# Patient Record
Sex: FEMALE | Race: WHITE | Employment: STUDENT | URBAN - METROPOLITAN AREA
[De-identification: names, ages, dates, MRNs, and addresses within clinical notes are randomized per-mention and may not be internally consistent; named-entity substitution may affect disease eponyms.]

---
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
## 2023-07-30 ENCOUNTER — OFFICE VISIT (OUTPATIENT)
Dept: FAMILY MEDICINE CLINIC | Facility: CLINIC | Age: 18
End: 2023-07-30
Payer: COMMERCIAL

## 2023-07-30 VITALS
OXYGEN SATURATION: 100 % | HEIGHT: 55 IN | RESPIRATION RATE: 18 BRPM | DIASTOLIC BLOOD PRESSURE: 80 MMHG | TEMPERATURE: 98 F | HEART RATE: 88 BPM | SYSTOLIC BLOOD PRESSURE: 124 MMHG | BODY MASS INDEX: 17.13 KG/M2 | WEIGHT: 74 LBS

## 2023-07-30 DIAGNOSIS — J02.9 PHARYNGITIS, UNSPECIFIED ETIOLOGY: ICD-10-CM

## 2023-07-30 DIAGNOSIS — K08.89 TOOTH PAIN: Primary | ICD-10-CM

## 2023-07-30 RX ORDER — AMOXICILLIN AND CLAVULANATE POTASSIUM 600; 42.9 MG/5ML; MG/5ML
800 POWDER, FOR SUSPENSION ORAL 2 TIMES DAILY
Qty: 140 ML | Refills: 0 | Status: SHIPPED | OUTPATIENT
Start: 2023-07-30 | End: 2023-08-09

## 2023-07-30 NOTE — PROGRESS NOTES
Subjective:   Patient ID: Nela Zamora is a 21year old female. Arnaldo Leigh has been experience right lower jaw/tooth pain for about 1 year. She was evaluated by a dentist and x-rays were taken and pt was cleared for any dental issues. Arnaldo Leigh explains that this area always hurts and that the pain is a dull ache. She feels that this area is also swollen. Arnaldo Leigh also states that her throat has been sore/scratchy for 1 day. Around this time last year, Arnaldo Leigh received an antibiotic and she explains that this antibiotic was for the same tooth pain. Notes form that time suggest abx rx was for lymphadenopathy. Denies fever and chills, reports sore throat. Advil helps a little bit. Still eating small amounts of soft food, the tooth is bothersome when eating. The pt has tried to see her dentist and was given an appointment in September. Arnaldo Leigh is her with her parents who are requesting an antibiotic so that the pt can eat again. History/Other:   Review of Systems   Constitutional:  Positive for appetite change. Negative for chills and fever. HENT:  Positive for dental problem, facial swelling, sinus pressure, sinus pain and sore throat. Negative for drooling, ear pain, trouble swallowing and voice change. Respiratory:  Negative for shortness of breath and wheezing. Cardiovascular:  Negative for chest pain and palpitations. Gastrointestinal:  Negative for abdominal pain. Musculoskeletal:  Negative for arthralgias and myalgias. All other systems reviewed and are negative. Current Outpatient Medications   Medication Sig Dispense Refill    amoxicillin-pot clavulanate (AUGMENTIN ES-600) 600-42.9 mg/5mL Oral Recon Susp Take 7 mL (840 mg total) by mouth 2 (two) times daily for 10 days. 140 mL 0    Albuterol Sulfate  (90 Base) MCG/ACT Inhalation Aero Soln Inhale 2 puffs via spacer every 4-6 hours for excessive cough, wheeze, or 20 mins before vigorous exercise if needed.  (Patient not taking: Reported on 7/30/2023) 18 g 0     Allergies:No Known Allergies    Objective:   Physical Exam  Vitals and nursing note reviewed. Exam conducted with a chaperone present. Constitutional:       Appearance: She is not ill-appearing. HENT:      Head: Normocephalic and atraumatic. Jaw: No trismus, tenderness or pain on movement. Salivary Glands: Right salivary gland is not diffusely enlarged or tender. Left salivary gland is not diffusely enlarged or tender. Right Ear: Ear canal and external ear normal. Tympanic membrane is scarred. Left Ear: Ear canal and external ear normal. Tympanic membrane is scarred. Mouth/Throat:      Lips: Pink. Mouth: Mucous membranes are moist. No injury, oral lesions or angioedema. Dentition: Does not have dentures. Dental tenderness present. No gingival swelling or gum lesions. Tongue: No lesions. Palate: No mass. Pharynx: Oropharynx is clear. Uvula midline. No oropharyngeal exudate or posterior oropharyngeal erythema. Tonsils: No tonsillar exudate. 1+ on the right. 1+ on the left. Cardiovascular:      Rate and Rhythm: Normal rate and regular rhythm. Pulmonary:      Effort: Pulmonary effort is normal.      Breath sounds: Normal breath sounds. No wheezing. Abdominal:      General: Abdomen is flat. Skin:     General: Skin is warm and dry. Neurological:      Mental Status: She is alert and oriented to person, place, and time.    Psychiatric:         Mood and Affect: Mood normal.         Behavior: Behavior normal.         Assessment & Plan:   Tooth pain  (primary encounter diagnosis)  Pharyngitis, unspecified etiology    Orders Placed This Encounter      Rapid Strep    - Rapid strep negative, PE not consisted with strep pharyngitis  - Discussed unremarkable physical exam   - Family requesting antibiotic to reduce oral pain  - Rx routed to pharmacy  - Discussed close follow up with PCP or dentist if pain continue despite abx rx  - Discussed re-evaluation in the ER if fever, chills, or flu-like symptoms occur to rule out dental abscess or have imagine performed  - Pt and family verbalized understanding and questions answered  - Instructions attached for self care for tooth pain and pharyngitis      Meds This Visit:  Requested Prescriptions     Signed Prescriptions Disp Refills    amoxicillin-pot clavulanate (AUGMENTIN ES-600) 600-42.9 mg/5mL Oral Recon Susp 140 mL 0     Sig: Take 7 mL (840 mg total) by mouth 2 (two) times daily for 10 days.        Imaging & Referrals:  None

## 2023-07-31 LAB
CONTROL LINE PRESENT WITH A CLEAR BACKGROUND (YES/NO): YES YES/NO
KIT LOT #: NORMAL NUMERIC

## 2023-08-02 ENCOUNTER — TELEPHONE (OUTPATIENT)
Dept: FAMILY MEDICINE CLINIC | Facility: CLINIC | Age: 18
End: 2023-08-02

## 2023-08-02 DIAGNOSIS — K08.89 PAIN, DENTAL: Primary | ICD-10-CM

## 2023-08-02 RX ORDER — AMOXICILLIN 400 MG/5ML
875 POWDER, FOR SUSPENSION ORAL 2 TIMES DAILY
Qty: 220 ML | Refills: 0 | Status: SHIPPED | OUTPATIENT
Start: 2023-08-02 | End: 2023-08-12

## 2023-08-02 NOTE — TELEPHONE ENCOUNTER
Patient calling to see if I can change Augmentin to Amoxicillin. She feels the Augmentin is \"too strong\" for her. She reports her dental pain is somewhat improving, it is intermittent in nature. Her symptoms are fevers, chills, feeling warm, sweaty. I advised this would likely not be caused by Augmentin antibiotic. Reviewed common side effects of antibiotics. I will switch her to plain Amoxicillin and have her discontinue the Augmentin. Per chart review, patient had a new onset sore throat at time of [facility] visit. Strep negative. Advised other symptoms may be related to that? Viral vs progressing dental infection. She should be re-evaluated if symptoms persist/worsen. She v/u and is comfortable with this plan.

## 2023-10-13 ENCOUNTER — IMMUNIZATION (OUTPATIENT)
Dept: LAB | Age: 18
End: 2023-10-13
Attending: EMERGENCY MEDICINE
Payer: COMMERCIAL

## 2023-10-13 DIAGNOSIS — Z23 NEED FOR VACCINATION: Primary | ICD-10-CM

## 2023-10-13 PROCEDURE — 90686 IIV4 VACC NO PRSV 0.5 ML IM: CPT

## 2023-10-13 PROCEDURE — 90471 IMMUNIZATION ADMIN: CPT

## 2023-11-03 ENCOUNTER — OFFICE VISIT (OUTPATIENT)
Dept: FAMILY MEDICINE CLINIC | Facility: CLINIC | Age: 18
End: 2023-11-03
Payer: COMMERCIAL

## 2023-11-03 VITALS
HEART RATE: 96 BPM | WEIGHT: 73 LBS | SYSTOLIC BLOOD PRESSURE: 112 MMHG | DIASTOLIC BLOOD PRESSURE: 78 MMHG | HEIGHT: 55.5 IN | BODY MASS INDEX: 16.65 KG/M2

## 2023-11-03 DIAGNOSIS — Z00.00 WELL ADULT EXAM: Primary | ICD-10-CM

## 2023-11-03 PROCEDURE — 3008F BODY MASS INDEX DOCD: CPT | Performed by: NURSE PRACTITIONER

## 2023-11-03 PROCEDURE — 99385 PREV VISIT NEW AGE 18-39: CPT | Performed by: NURSE PRACTITIONER

## 2023-11-03 PROCEDURE — 3078F DIAST BP <80 MM HG: CPT | Performed by: NURSE PRACTITIONER

## 2023-11-03 PROCEDURE — 3074F SYST BP LT 130 MM HG: CPT | Performed by: NURSE PRACTITIONER

## 2024-02-10 ENCOUNTER — LAB ENCOUNTER (OUTPATIENT)
Dept: LAB | Age: 19
End: 2024-02-10
Attending: NURSE PRACTITIONER
Payer: COMMERCIAL

## 2024-02-10 LAB
ALBUMIN SERPL-MCNC: 4.7 G/DL (ref 3.2–4.8)
ALBUMIN/GLOB SERPL: 1.3 {RATIO} (ref 1–2)
ALP LIVER SERPL-CCNC: 69 U/L
ALT SERPL-CCNC: 18 U/L
ANION GAP SERPL CALC-SCNC: 8 MMOL/L (ref 0–18)
AST SERPL-CCNC: 29 U/L (ref ?–34)
BASOPHILS # BLD AUTO: 0.04 X10(3) UL (ref 0–0.2)
BASOPHILS NFR BLD AUTO: 0.9 %
BILIRUB SERPL-MCNC: 0.8 MG/DL (ref 0.3–1.2)
BILIRUB UR QL: NEGATIVE
BUN BLD-MCNC: 10 MG/DL (ref 9–23)
BUN/CREAT SERPL: 11.6 (ref 10–20)
CALCIUM BLD-MCNC: 9.8 MG/DL (ref 8.7–10.4)
CHLORIDE SERPL-SCNC: 102 MMOL/L (ref 98–112)
CHOLEST SERPL-MCNC: 198 MG/DL (ref ?–200)
CLARITY UR: CLEAR
CO2 SERPL-SCNC: 28 MMOL/L (ref 21–32)
COLOR UR: YELLOW
CREAT BLD-MCNC: 0.86 MG/DL
DEPRECATED RDW RBC AUTO: 37.4 FL (ref 35.1–46.3)
EGFRCR SERPLBLD CKD-EPI 2021: 99 ML/MIN/1.73M2 (ref 60–?)
EOSINOPHIL # BLD AUTO: 0.08 X10(3) UL (ref 0–0.7)
EOSINOPHIL NFR BLD AUTO: 1.8 %
ERYTHROCYTE [DISTWIDTH] IN BLOOD BY AUTOMATED COUNT: 12.3 % (ref 11–15)
FASTING PATIENT LIPID ANSWER: YES
FASTING STATUS PATIENT QL REPORTED: YES
GLOBULIN PLAS-MCNC: 3.6 G/DL (ref 2.8–4.4)
GLUCOSE BLD-MCNC: 87 MG/DL (ref 70–99)
GLUCOSE UR-MCNC: NORMAL MG/DL
HCT VFR BLD AUTO: 48 %
HDLC SERPL-MCNC: 89 MG/DL (ref 40–59)
HGB BLD-MCNC: 15.7 G/DL
HGB UR QL STRIP.AUTO: NEGATIVE
IMM GRANULOCYTES # BLD AUTO: 0 X10(3) UL (ref 0–1)
IMM GRANULOCYTES NFR BLD: 0 %
KETONES UR-MCNC: 40 MG/DL
LDLC SERPL CALC-MCNC: 95 MG/DL (ref ?–100)
LEUKOCYTE ESTERASE UR QL STRIP.AUTO: NEGATIVE
LYMPHOCYTES # BLD AUTO: 2.21 X10(3) UL (ref 1–4)
LYMPHOCYTES NFR BLD AUTO: 48.4 %
MCH RBC QN AUTO: 27.2 PG (ref 26–34)
MCHC RBC AUTO-ENTMCNC: 32.7 G/DL (ref 31–37)
MCV RBC AUTO: 83.2 FL
MONOCYTES # BLD AUTO: 0.37 X10(3) UL (ref 0.1–1)
MONOCYTES NFR BLD AUTO: 8.1 %
NEUTROPHILS # BLD AUTO: 1.87 X10 (3) UL (ref 1.5–7.7)
NEUTROPHILS # BLD AUTO: 1.87 X10(3) UL (ref 1.5–7.7)
NEUTROPHILS NFR BLD AUTO: 40.8 %
NITRITE UR QL STRIP.AUTO: NEGATIVE
NONHDLC SERPL-MCNC: 109 MG/DL (ref ?–130)
OSMOLALITY SERPL CALC.SUM OF ELEC: 284 MOSM/KG (ref 275–295)
PH UR: 5.5 [PH] (ref 5–8)
PLATELET # BLD AUTO: 285 10(3)UL (ref 150–450)
POTASSIUM SERPL-SCNC: 3.8 MMOL/L (ref 3.5–5.1)
PROT SERPL-MCNC: 8.3 G/DL (ref 5.7–8.2)
RBC # BLD AUTO: 5.77 X10(6)UL
SODIUM SERPL-SCNC: 138 MMOL/L (ref 136–145)
SP GR UR STRIP: 1.03 (ref 1–1.03)
TRIGL SERPL-MCNC: 81 MG/DL (ref 30–149)
TSI SER-ACNC: 2.55 MIU/ML (ref 0.55–4.78)
UROBILINOGEN UR STRIP-ACNC: NORMAL
VLDLC SERPL CALC-MCNC: 13 MG/DL (ref 0–30)
WBC # BLD AUTO: 4.6 X10(3) UL (ref 4–11)

## 2024-02-10 PROCEDURE — 85060 BLOOD SMEAR INTERPRETATION: CPT | Performed by: NURSE PRACTITIONER

## 2024-02-10 PROCEDURE — 81003 URINALYSIS AUTO W/O SCOPE: CPT | Performed by: NURSE PRACTITIONER

## 2024-02-10 PROCEDURE — 36415 COLL VENOUS BLD VENIPUNCTURE: CPT | Performed by: NURSE PRACTITIONER

## 2024-02-10 PROCEDURE — 80053 COMPREHEN METABOLIC PANEL: CPT | Performed by: NURSE PRACTITIONER

## 2024-02-10 PROCEDURE — 80061 LIPID PANEL: CPT | Performed by: NURSE PRACTITIONER

## 2024-02-10 PROCEDURE — 85025 COMPLETE CBC W/AUTO DIFF WBC: CPT | Performed by: NURSE PRACTITIONER

## 2024-02-10 PROCEDURE — 84443 ASSAY THYROID STIM HORMONE: CPT | Performed by: NURSE PRACTITIONER

## 2024-02-12 DIAGNOSIS — D75.1 ERYTHROCYTOSIS: Primary | ICD-10-CM

## 2024-03-10 ENCOUNTER — PATIENT MESSAGE (OUTPATIENT)
Dept: FAMILY MEDICINE CLINIC | Facility: CLINIC | Age: 19
End: 2024-03-10

## 2024-03-10 NOTE — TELEPHONE ENCOUNTER
From: Jaclyn So  To: Laura Hoskins  Sent: 3/10/2024 10:03 AM CDT  Subject: Weight/BMI question     Hi Doctor Gato,     I’ve noticed my BMI was 16.66 when I saw you at my first check up. What weight range should I be in since I’m 73 lbs currently? Should I start going to the gym to gain weight? Or any specific foods for my diet?     Thank you,     Jaclyn So

## 2024-08-25 ENCOUNTER — PATIENT MESSAGE (OUTPATIENT)
Dept: FAMILY MEDICINE CLINIC | Facility: CLINIC | Age: 19
End: 2024-08-25

## 2024-08-25 DIAGNOSIS — Z71.3 NUTRITIONAL COUNSELING: Primary | ICD-10-CM

## 2024-08-26 NOTE — TELEPHONE ENCOUNTER
Pended referral requested by the patient.     Please refer to [employer] message sent to patient on 3/12/24 by WAN Roman

## 2024-10-03 ENCOUNTER — IMMUNIZATION (OUTPATIENT)
Dept: LAB | Age: 19
End: 2024-10-03
Attending: EMERGENCY MEDICINE
Payer: COMMERCIAL

## 2024-10-03 DIAGNOSIS — Z23 NEED FOR VACCINATION: Primary | ICD-10-CM

## 2024-10-03 PROCEDURE — 90656 IIV3 VACC NO PRSV 0.5 ML IM: CPT

## 2024-10-03 PROCEDURE — 90471 IMMUNIZATION ADMIN: CPT

## 2024-10-19 ENCOUNTER — HOSPITAL ENCOUNTER (OUTPATIENT)
Age: 19
Discharge: HOME | End: 2024-10-19
Payer: COMMERCIAL

## 2024-10-19 VITALS
TEMPERATURE: 100 F | DIASTOLIC BLOOD PRESSURE: 74 MMHG | HEART RATE: 116 BPM | WEIGHT: 73 LBS | BODY MASS INDEX: 17 KG/M2 | SYSTOLIC BLOOD PRESSURE: 115 MMHG | RESPIRATION RATE: 20 BRPM | OXYGEN SATURATION: 98 %

## 2024-10-19 DIAGNOSIS — K08.89 PAIN, DENTAL: Primary | ICD-10-CM

## 2024-10-19 RX ORDER — AMOXICILLIN AND CLAVULANATE POTASSIUM 600; 42.9 MG/5ML; MG/5ML
875 POWDER, FOR SUSPENSION ORAL 2 TIMES DAILY
Qty: 140 ML | Refills: 0 | Status: SHIPPED | OUTPATIENT
Start: 2024-10-19 | End: 2024-10-29

## 2024-10-19 NOTE — ED PROVIDER NOTES
Patient Seen in: [facility]      History     Chief Complaint   Patient presents with    Fever    Dental Problem    Nausea/Vomiting/Diarrhea     Stated Complaint: Fever, Vomiting  Subjective:   HPI    This is a well-appearing 18-year-old female who presents with parents for bilateral lower molar teeth pain.  Patient has had a history of similar in the past.  Just recently went to the dentist to have teeth cleaned 2 weeks ago.  Yesterday symptoms started with chills, body aches and dental pain.  Concern for infection and molar teeth.  Denies any cough, congestion or other URI symptoms.    Objective:   Past Medical History:    Acne    Asthma (HCC)    Extrinsic asthma, unspecified    Last used Albuterol for , per Father     infant (HCC)    per NG:  23 weeks. hx of intubation for 2 months, home oxygen for 3 months    Short stature (child)            Past Surgical History:   Procedure Laterality Date    Create eardrum opening,gen anesth      Other Left 2017    Repair of L perforated TM    Other surgical history      heart defect correction as infant (possibly patent foramen ovale)    Other surgical history      lazer eye surgery as infant              Social History     Socioeconomic History    Marital status: Single   Tobacco Use    Smoking status: Never    Smokeless tobacco: Never   Vaping Use    Vaping status: Never Used   Substance and Sexual Activity    Alcohol use: Yes     Alcohol/week: 3.0 standard drinks of alcohol     Types: 3 Standard drinks or equivalent per week    Drug use: Never   Other Topics Concern    Second-hand smoke exposure No            Review of Systems   All other systems reviewed and are negative.      Positive for stated complaint: Fever, Dental Problem, and Nausea/Vomiting/Diarrhea    Other systems are as noted in HPI.  Constitutional and vital signs reviewed.      All other systems reviewed and negative except as noted above.    Physical Exam     ED Triage  Vitals [10/19/24 1209]   /74   Pulse 116   Resp 20   Temp 100.2 °F (37.9 °C)   Temp src Oral   SpO2 98 %   O2 Device None (Room air)     Current:/74   Pulse 116   Temp 100.2 °F (37.9 °C) (Oral)   Resp 20   Wt 33.1 kg   LMP 10/16/2024 (Approximate)   SpO2 98%   BMI 16.66 kg/m²     Physical Exam  Vitals and nursing note reviewed.   Constitutional:       General: She is awake. She is not in acute distress.     Appearance: Normal appearance. She is not ill-appearing, toxic-appearing or diaphoretic.   HENT:      Head: Normocephalic and atraumatic.      Right Ear: Tympanic membrane, ear canal and external ear normal.      Left Ear: Tympanic membrane, ear canal and external ear normal.      Nose: Nose normal.      Mouth/Throat:      Lips: Pink.      Mouth: Mucous membranes are moist.      Pharynx: Oropharynx is clear. Uvula midline.        Comments: Gums partially covering bilateral molar teeth. +tenderness. No erythema or evidence of drainable abscess   Eyes:      General: Lids are normal.      Extraocular Movements: Extraocular movements intact.      Conjunctiva/sclera: Conjunctivae normal.      Pupils: Pupils are equal, round, and reactive to light.   Cardiovascular:      Rate and Rhythm: Normal rate and regular rhythm.      Pulses: Normal pulses.      Heart sounds: Normal heart sounds.   Pulmonary:      Effort: Pulmonary effort is normal.      Breath sounds: Normal breath sounds and air entry. No stridor, decreased air movement or transmitted upper airway sounds.   Musculoskeletal:      Cervical back: Full passive range of motion without pain and normal range of motion.   Skin:     General: Skin is warm and dry.      Capillary Refill: Capillary refill takes less than 2 seconds.   Neurological:      General: No focal deficit present.      Mental Status: She is alert and oriented to person, place, and time.   Psychiatric:         Mood and Affect: Mood normal.         Behavior: Behavior normal. Behavior  is cooperative.         Thought Content: Thought content normal.         Judgment: Judgment normal.         ED Course     No results found.  Labs Reviewed - No data to display    MDM     Medical Decision Making  Differential diagnoses reflecting the complexity of care include but are not limited to influenza, COVID, dental abscess, dental infection.    Comorbidities that add complexity to management include: N/A  History obtained by an independent source was from: Patient mother and father  Discussions of management was done with: N/A  My independent interpretations of studies include: N/A  Shared decision making was done by: Patient parents, patient and myself  Patient is well appearing, non-toxic and in no acute distress.  Vital signs are stable.  No evidence of abscess, she does have tenderness to bilateral lower molars.  I did discuss with the patient low-grade fever, chills and bodyaches may also be indicative of viral illness such as influenza or COVID.  They are declining any additional testing at this time.  They have been alternating Tylenol and Motrin with relief of fever.  They state that these symptoms have happened in the past when she had an infection in her tooth.  Would like oral antibiotic treatment at this time.  Does have a follow-up appoint with dentist early next week.  Will treat with Augmentin which she has tolerated in the past.  Also encouraged Tylenol and ibuprofen.    Problems Addressed:  Pain, dental: acute illness or injury    Risk  OTC drugs.  Prescription drug management.        Disposition and Plan     Clinical Impression:  1. Pain, dental         Disposition:  Discharge  10/19/2024 12:31 pm    Follow-up:  Dentist                Medications Prescribed:  Discharge Medication List as of 10/19/2024 12:32 PM        START taking these medications    Details   amoxicillin-pot clavulanate (AUGMENTIN ES-600) 600-42.9 mg/5mL Oral Recon Susp Take 7 mL (840 mg total) by mouth 2 (two) times daily  for 10 days., Normal, Disp-140 mL, R-0                Note to patient: The 21st Century cares act makes medical notes like these available to patients in the interest of transparency.  However, be advised this medical document and is intended as peer to peer communication.  It is read the medical language and may contain abbreviations or verbiage that are unfamiliar.  It may appear blunt or direct.  Medical documents are intended to carry relevant information, fax is evident and the clinical opinion of the practitioner.    This note was prepared using Dragon Medical voice recognition dictation software.  As a result, errors may occur.  When identified, these errors have been corrected.  While every attempt is made to correct errors during dictation, discrepancies may still exist.    Suyapa Landis, WAN  10/19/2024  12:39 PM

## 2024-10-19 NOTE — ED INITIAL ASSESSMENT (HPI)
Patient reports fever since 1 am patient also c/o bilateral lower molar pain after biting something hard. Patient vomited 1 x early AM. Patient and family are not great historians

## 2024-10-19 NOTE — DISCHARGE INSTRUCTIONS
Please start antibiotics and complete entire course of treatment.  Continue to take Tylenol and ibuprofen for pain or fever.  I would like you to call your dentist to schedule a follow-up appointment.

## 2024-10-22 ENCOUNTER — OFFICE VISIT (OUTPATIENT)
Dept: FAMILY MEDICINE CLINIC | Facility: CLINIC | Age: 19
End: 2024-10-22

## 2024-10-22 VITALS
DIASTOLIC BLOOD PRESSURE: 78 MMHG | HEART RATE: 108 BPM | TEMPERATURE: 100 F | BODY MASS INDEX: 16 KG/M2 | WEIGHT: 68.5 LBS | SYSTOLIC BLOOD PRESSURE: 111 MMHG

## 2024-10-22 DIAGNOSIS — B34.9 VIRAL SYNDROME: ICD-10-CM

## 2024-10-22 DIAGNOSIS — K08.89 PAIN, DENTAL: Primary | ICD-10-CM

## 2024-10-22 PROCEDURE — 99213 OFFICE O/P EST LOW 20 MIN: CPT | Performed by: NURSE PRACTITIONER

## 2024-10-22 PROCEDURE — 3074F SYST BP LT 130 MM HG: CPT | Performed by: NURSE PRACTITIONER

## 2024-10-22 PROCEDURE — 3078F DIAST BP <80 MM HG: CPT | Performed by: NURSE PRACTITIONER

## 2024-10-22 NOTE — PROGRESS NOTES
HPI    Patient presents for concerns of body aches and chills since this morning.  Patient was seen at [facility] emergency room yesterday where she had a viral panel done as well as strep test.  All labs were normal.  Also seen at our urgent care on 10/19 for dental pain.  She was prescribed augmentin which she is still currently taking.  Is taking tylenol for symptom relief which she states helps on and off.  Works as a .      Review of Systems   Constitutional:  Positive for chills and fever.   HENT:  Positive for dental problem.    Musculoskeletal:  Positive for myalgias.   All other systems reviewed and are negative.       Vitals:    10/22/24 1606   BP: 111/78   Pulse: 108   Temp: 100.1 °F (37.8 °C)   TempSrc: Tympanic   Weight: 68 lb 8 oz (31.1 kg)     Body mass index is 15.64 kg/m².    Health Maintenance   Topic Date Due    Annual Depression Screening  2024    COVID-19 Vaccine (3 - 2023-24 season) 2024    Annual Physical  2024    DTaP,Tdap,and Td Vaccines (7 - Td or Tdap) 2024    Influenza Vaccine  Completed    Pneumococcal Vaccine: Birth to 64yrs  Completed    Hepatitis B Vaccines  Completed    Hepatitis A Vaccines  Completed    MMR Vaccines  Completed    Varicella Vaccines  Completed    Meningococcal Vaccine  Completed    HPV Vaccines  Completed       Patient's last menstrual period was 10/16/2024 (approximate).    Past Medical History:    Acne    Asthma (HCC)    Extrinsic asthma, unspecified    Last used Albuterol for , per Father     infant (HCC)    per NG:  23 weeks. hx of intubation for 2 months, home oxygen for 3 months    Short stature (child)       .  Past Surgical History:   Procedure Laterality Date    Create eardrum opening,gen anesth      Other Left 2017    Repair of L perforated TM    Other surgical history      heart defect correction as infant (possibly patent foramen ovale)    Other surgical history      lazer eye surgery as infant        Family History   Problem Relation Age of Onset    Heart Disorder Father         Arrythmia?    Hypertension Father     Lipids Father     Diabetes Father     Alcohol and Other Disorders Associated Father     Anxiety Mother     Diabetes Paternal Grandmother     Asthma Paternal Aunt     Anxiety Paternal Aunt     Cancer Neg     Thyroid disease Neg        Social History     Socioeconomic History    Marital status: Single     Spouse name: Not on file    Number of children: Not on file    Years of education: Not on file    Highest education level: Not on file   Occupational History    Not on file   Tobacco Use    Smoking status: Never    Smokeless tobacco: Never   Vaping Use    Vaping status: Never Used   Substance and Sexual Activity    Alcohol use: Yes     Alcohol/week: 3.0 standard drinks of alcohol     Types: 3 Standard drinks or equivalent per week    Drug use: Never    Sexual activity: Not on file   Other Topics Concern    Second-hand smoke exposure No    Alcohol/drug concerns Not Asked    Violence concerns Not Asked   Social History Narrative    Not on file     Social Drivers of Health     Financial Resource Strain: Not on file   Food Insecurity: Not on file   Transportation Needs: Not on file   Physical Activity: Not on file   Stress: Not on file   Social Connections: Not on file   Housing Stability: Not on file       Current Outpatient Medications   Medication Sig Dispense Refill    amoxicillin-pot clavulanate (AUGMENTIN ES-600) 600-42.9 mg/5mL Oral Recon Susp Take 7 mL (840 mg total) by mouth 2 (two) times daily for 10 days. 140 mL 0    Albuterol Sulfate  (90 Base) MCG/ACT Inhalation Aero Soln Inhale 2 puffs via spacer every 4-6 hours for excessive cough, wheeze, or 20 mins before vigorous exercise if needed. 18 g 0       Allergies:  Allergies[1]    Physical Exam  Vitals and nursing note reviewed.   Constitutional:       General: She is not in acute distress.     Appearance: Normal appearance.   HENT:       Head: Normocephalic and atraumatic.      Right Ear: Tympanic membrane, ear canal and external ear normal. There is no impacted cerumen.      Left Ear: There is impacted cerumen.      Nose: Nose normal. No congestion or rhinorrhea.      Mouth/Throat:      Mouth: Mucous membranes are moist.      Pharynx: Oropharynx is clear. No oropharyngeal exudate or posterior oropharyngeal erythema.   Cardiovascular:      Rate and Rhythm: Normal rate and regular rhythm.      Heart sounds: Normal heart sounds.   Pulmonary:      Effort: Pulmonary effort is normal. No respiratory distress.      Breath sounds: Normal breath sounds. No stridor. No wheezing, rhonchi or rales.   Chest:      Chest wall: No tenderness.   Neurological:      Mental Status: She is alert and oriented to person, place, and time.          Assessment and Plan:  Problem List Items Addressed This Visit    None  Visit Diagnoses       Pain, dental    -  Primary    Viral syndrome                 Continue Augmentin as prescribed.  Supportive care discussed.  Return to work letter written to return in 2 days.     Discussed plan of care with patient and patient is in agreement.  All questions answered. Patient to call with questions or concerns.    Encouraged to sign up for My Chart if not already registered.        [1] No Known Allergies

## 2025-05-04 ENCOUNTER — LAB ENCOUNTER (OUTPATIENT)
Dept: LAB | Facility: HOSPITAL | Age: 20
End: 2025-05-04
Attending: NURSE PRACTITIONER
Payer: COMMERCIAL

## 2025-05-04 ENCOUNTER — OFFICE VISIT (OUTPATIENT)
Dept: FAMILY MEDICINE CLINIC | Facility: CLINIC | Age: 20
End: 2025-05-04
Payer: COMMERCIAL

## 2025-05-04 VITALS
DIASTOLIC BLOOD PRESSURE: 72 MMHG | SYSTOLIC BLOOD PRESSURE: 122 MMHG | BODY MASS INDEX: 16.02 KG/M2 | HEIGHT: 55.5 IN | HEART RATE: 94 BPM | WEIGHT: 70.19 LBS

## 2025-05-04 DIAGNOSIS — Z00.00 WELL ADULT EXAM: Primary | ICD-10-CM

## 2025-05-04 DIAGNOSIS — R63.0 ANOREXIA: ICD-10-CM

## 2025-05-04 LAB
ALBUMIN SERPL-MCNC: 4.7 G/DL (ref 3.2–4.8)
ALBUMIN/GLOB SERPL: 1.4 {RATIO} (ref 1–2)
ALP LIVER SERPL-CCNC: 62 U/L (ref 52–144)
ALT SERPL-CCNC: 21 U/L (ref 10–49)
ANION GAP SERPL CALC-SCNC: 9 MMOL/L (ref 0–18)
AST SERPL-CCNC: 31 U/L (ref ?–34)
BASOPHILS # BLD AUTO: 0.05 X10(3) UL (ref 0–0.2)
BASOPHILS NFR BLD AUTO: 1.1 %
BILIRUB SERPL-MCNC: 0.8 MG/DL (ref 0.3–1.2)
BUN BLD-MCNC: 8 MG/DL (ref 9–23)
BUN/CREAT SERPL: 9.4 (ref 10–20)
CALCIUM BLD-MCNC: 9.4 MG/DL (ref 8.7–10.4)
CHLORIDE SERPL-SCNC: 102 MMOL/L (ref 98–112)
CHOLEST SERPL-MCNC: 213 MG/DL (ref ?–200)
CO2 SERPL-SCNC: 28 MMOL/L (ref 21–32)
CREAT BLD-MCNC: 0.85 MG/DL (ref 0.55–1.02)
DEPRECATED RDW RBC AUTO: 44.4 FL (ref 35.1–46.3)
EGFRCR SERPLBLD CKD-EPI 2021: 100 ML/MIN/1.73M2 (ref 60–?)
EOSINOPHIL # BLD AUTO: 0.07 X10(3) UL (ref 0–0.7)
EOSINOPHIL NFR BLD AUTO: 1.6 %
ERYTHROCYTE [DISTWIDTH] IN BLOOD BY AUTOMATED COUNT: 13.9 % (ref 11–15)
FASTING PATIENT LIPID ANSWER: YES
FASTING STATUS PATIENT QL REPORTED: YES
GLOBULIN PLAS-MCNC: 3.4 G/DL (ref 2–3.5)
GLUCOSE BLD-MCNC: 88 MG/DL (ref 70–99)
HCT VFR BLD AUTO: 47 % (ref 35–48)
HDLC SERPL-MCNC: 95 MG/DL (ref 40–59)
HGB BLD-MCNC: 15 G/DL (ref 12–16)
IMM GRANULOCYTES # BLD AUTO: 0 X10(3) UL (ref 0–1)
IMM GRANULOCYTES NFR BLD: 0 %
LDLC SERPL CALC-MCNC: 106 MG/DL (ref ?–100)
LYMPHOCYTES # BLD AUTO: 1.67 X10(3) UL (ref 1–4)
LYMPHOCYTES NFR BLD AUTO: 37.2 %
MCH RBC QN AUTO: 27.6 PG (ref 26–34)
MCHC RBC AUTO-ENTMCNC: 31.9 G/DL (ref 31–37)
MCV RBC AUTO: 86.6 FL (ref 80–100)
MONOCYTES # BLD AUTO: 0.29 X10(3) UL (ref 0.1–1)
MONOCYTES NFR BLD AUTO: 6.5 %
NEUTROPHILS # BLD AUTO: 2.41 X10 (3) UL (ref 1.5–7.7)
NEUTROPHILS # BLD AUTO: 2.41 X10(3) UL (ref 1.5–7.7)
NEUTROPHILS NFR BLD AUTO: 53.6 %
NONHDLC SERPL-MCNC: 118 MG/DL (ref ?–130)
OSMOLALITY SERPL CALC.SUM OF ELEC: 286 MOSM/KG (ref 275–295)
PLATELET # BLD AUTO: 297 10(3)UL (ref 150–450)
POTASSIUM SERPL-SCNC: 3.5 MMOL/L (ref 3.5–5.1)
PROT SERPL-MCNC: 8.1 G/DL (ref 5.7–8.2)
RBC # BLD AUTO: 5.43 X10(6)UL (ref 3.8–5.3)
SODIUM SERPL-SCNC: 139 MMOL/L (ref 136–145)
TRIGL SERPL-MCNC: 65 MG/DL (ref 30–149)
TSI SER-ACNC: 1.17 UIU/ML (ref 0.55–4.78)
VLDLC SERPL CALC-MCNC: 11 MG/DL (ref 0–30)
WBC # BLD AUTO: 4.5 X10(3) UL (ref 4–11)

## 2025-05-04 PROCEDURE — 85025 COMPLETE CBC W/AUTO DIFF WBC: CPT | Performed by: NURSE PRACTITIONER

## 2025-05-04 PROCEDURE — 80061 LIPID PANEL: CPT | Performed by: NURSE PRACTITIONER

## 2025-05-04 PROCEDURE — 84443 ASSAY THYROID STIM HORMONE: CPT | Performed by: NURSE PRACTITIONER

## 2025-05-04 PROCEDURE — 80053 COMPREHEN METABOLIC PANEL: CPT | Performed by: NURSE PRACTITIONER

## 2025-05-04 PROCEDURE — 36415 COLL VENOUS BLD VENIPUNCTURE: CPT | Performed by: NURSE PRACTITIONER

## 2025-05-04 NOTE — PROGRESS NOTES
HPI    Patient presents for annual physical.     Gyne history - G0.    Last pap - never has been sexually active.    LMP - Patient's last menstrual period was 05/02/2025 (exact date).  Diet - with concerns with not being able to eat well.  Does not feel hungry.  Does have a bit of aversion to food.  Would like a referral to a dietitian.  Exercise - minimal.      Review of Systems   All other systems reviewed and are negative.       Vitals:    05/04/25 0857   BP: (!) 126/92   Pulse: 94   Weight: 70 lb 3.2 oz (31.8 kg)   Height: 4' 7.5\" (1.41 m)     Body mass index is 16.02 kg/m².    Health Maintenance   Topic Date Due    Meningococcal B Vaccine (1 of 2 - Standard) Never done    COVID-19 Vaccine (3 - 2024-25 season) 09/01/2024    Annual Physical  11/03/2024    DTaP,Tdap,and Td Vaccines (7 - Td or Tdap) 12/06/2024    Pap Smear  Never done    HTN: BP Follow-Up  06/04/2025    Influenza Vaccine  Completed    Annual Depression Screening  Completed    Pneumococcal Vaccine: Birth to 50yrs  Completed    Hepatitis B Vaccines  Completed    Hepatitis A Vaccines  Completed    MMR Vaccines  Completed    Varicella Vaccines  Completed    Meningococcal Vaccine  Completed    HPV Vaccines  Completed       Past Medical History[1]    .Past Surgical History[2]    Family History[3]    Social History     Socioeconomic History    Marital status: Single     Spouse name: Not on file    Number of children: Not on file    Years of education: Not on file    Highest education level: Not on file   Occupational History    Not on file   Tobacco Use    Smoking status: Never    Smokeless tobacco: Never   Vaping Use    Vaping status: Never Used   Substance and Sexual Activity    Alcohol use: Not Currently     Alcohol/week: 3.0 standard drinks of alcohol     Types: 3 Standard drinks or equivalent per week    Drug use: Never    Sexual activity: Not on file   Other Topics Concern    Second-hand smoke exposure No    Alcohol/drug concerns Not Asked     Violence concerns Not Asked   Social History Narrative    Not on file     Social Drivers of Health     Food Insecurity: No Food Insecurity (11/11/2024)    Received from [facility]    Hunger Vital Sign     Worried About Running Out of Food in the Last Year: Never true     Ran Out of Food in the Last Year: Never true   Transportation Needs: No Transportation Needs (11/11/2024)    Received from [facility]    PRAPARE - Transportation     Lack of Transportation (Medical): No     Lack of Transportation (Non-Medical): No   Stress: Not on file   Housing Stability: Unknown (11/11/2024)    Received from [facility]    Housing Stability Vital Sign     Unable to Pay for Housing in the Last Year: No     Number of Times Moved in the Last Year: Not on file     Homeless in the Last Year: Not on file       Current Medications[4]    Allergies:  Allergies[5]    Physical Exam  Vitals and nursing note reviewed.   Constitutional:       General: She is not in acute distress.     Appearance: Normal appearance. She is well-developed. She is not ill-appearing, toxic-appearing or diaphoretic.   HENT:      Head: Normocephalic and atraumatic.      Right Ear: Hearing, tympanic membrane, ear canal and external ear normal. There is no impacted cerumen.      Left Ear: Hearing, tympanic membrane, ear canal and external ear normal. There is no impacted cerumen.      Nose: Nose normal. No congestion or rhinorrhea.      Mouth/Throat:      Mouth: Mucous membranes are moist.      Pharynx: Oropharynx is clear. No oropharyngeal exudate or posterior oropharyngeal erythema.   Eyes:      General:         Right eye: No discharge.         Left eye: No discharge.      Conjunctiva/sclera: Conjunctivae normal.   Neck:      Thyroid: No thyromegaly.   Cardiovascular:      Rate and Rhythm: Normal rate and regular rhythm.      Pulses: Normal pulses.      Heart sounds: Normal heart sounds. No murmur  heard.  Pulmonary:      Effort: Pulmonary effort is normal. No respiratory distress.      Breath sounds: Normal breath sounds. No stridor. No wheezing, rhonchi or rales.   Chest:      Chest wall: No tenderness.   Abdominal:      General: Abdomen is flat. Bowel sounds are normal. There is no distension.      Palpations: Abdomen is soft. There is no mass.      Tenderness: There is no abdominal tenderness. There is no guarding or rebound.      Hernia: No hernia is present.   Musculoskeletal:         General: Normal range of motion.      Cervical back: Normal range of motion and neck supple.   Lymphadenopathy:      Cervical: No cervical adenopathy.   Skin:     General: Skin is warm and dry.   Neurological:      Mental Status: She is alert and oriented to person, place, and time.   Psychiatric:         Mood and Affect: Mood normal.         Behavior: Behavior normal.         Thought Content: Thought content normal.         Judgment: Judgment normal.          Assessment and Plan:   Problem List Items Addressed This Visit    None  Visit Diagnoses         Well adult exam    -  Primary    Relevant Orders    CBC With Differential With Platelet    Comp Metabolic Panel (14)    Lipid Panel    TSH W Reflex To Free T4      Anorexia        Relevant Orders    DIETITIAN EDUCATION NON-DIABETES, DIET (INTERNAL)      Body mass index (BMI) less than 16.5        Relevant Orders    DIETITIAN EDUCATION NON-DIABETES, DIET (INTERNAL)           Follow-up for fasting labs.  Referral to dietitian.    Discussed plan of care with patient and patient is in agreement.  All questions answered. Patient to call with questions or concerns.    Encouraged to sign up for My Chart if not already registered.        [1]   Past Medical History:   Acne    Asthma (HCC)    Extrinsic asthma, unspecified    Last used Albuterol for , per Father     infant (HCC)    per NG:  23 weeks. hx of intubation for 2 months, home oxygen for 3 months    Short  stature (child)   [2]   Past Surgical History:  Procedure Laterality Date    Create eardrum opening,gen anesth      Other Left 08/08/2017    Repair of L perforated TM    Other surgical history      heart defect correction as infant (possibly patent foramen ovale)    Other surgical history      lazer eye surgery as infant   [3]   Family History  Problem Relation Age of Onset    Heart Disorder Father         Arrythmia?    Hypertension Father     Lipids Father     Diabetes Father     Alcohol and Other Disorders Associated Father     Anxiety Mother     Diabetes Paternal Grandmother     Asthma Paternal Aunt     Anxiety Paternal Aunt     Cancer Neg     Thyroid disease Neg    [4]   Current Outpatient Medications   Medication Sig Dispense Refill    Albuterol Sulfate  (90 Base) MCG/ACT Inhalation Aero Soln Inhale 2 puffs via spacer every 4-6 hours for excessive cough, wheeze, or 20 mins before vigorous exercise if needed. (Patient not taking: Reported on 5/4/2025) 18 g 0   [5] No Known Allergies

## 2025-05-08 ENCOUNTER — ORDER TRANSCRIPTION (OUTPATIENT)
Dept: ADMINISTRATIVE | Facility: HOSPITAL | Age: 20
End: 2025-05-08

## 2025-05-08 DIAGNOSIS — R63.0 ANOREXIA: Primary | ICD-10-CM

## 2025-05-15 ENCOUNTER — HOSPITAL ENCOUNTER (OUTPATIENT)
Dept: NUTRITION | Facility: HOSPITAL | Age: 20
Discharge: HOME | End: 2025-05-15
Attending: NURSE PRACTITIONER
Payer: COMMERCIAL

## 2025-05-15 VITALS — HEIGHT: 55.24 IN | WEIGHT: 70 LBS | BODY MASS INDEX: 16.2 KG/M2

## 2025-05-15 DIAGNOSIS — R63.0 ANOREXIA: Primary | ICD-10-CM

## 2025-05-15 PROCEDURE — 97802 MEDICAL NUTRITION INDIV IN: CPT

## 2025-05-15 NOTE — PROGRESS NOTES
Nutrition Assessment    Jaclyn So is a 19 year old female.    Referred by: Attending  Referring Physician Name: Laura Hoskins    Spent 60minutes with patient    Assessment     Medical Nutrition Therapy Comment: BMI less than 16.5kg/m2 (Z68.1 ) ,  Anorexia (R63.0)  Visit Information: Initial Visit 5/15/25    ANTHROPOMETRICS  HT: 4' 7.25\" (140.3 cm)  WT: 70 lbs (31.8 kg)  UBW: 68-73lbs  IBW: 40 kg/88 lbs  80% IBW  BMI: 16.13 kg/m2  BMI CLASSIFICATION: less than 19 kg/m2 - underweight      SIGNIFICANT MEDICAL Hx  Significant Past Medical Hx: ex 23 week premie  Pertinent Medications: Medications - Current[1]  Pertinent Lab Results: labs reviewed from 5/4/25    DIET HISTORY  Number of meals per day: 2  Number of snacks per day: 1    Comment: Jaclyn is here with her father to discuss her diet and low BMI.  Pt was born prematurely at 23 weeks gestation and dad reports always having issues with po intake and wt gain. He describes her as a picky eater and had difficulty transitioning to solids as baby. She confirms she is a picky eater and does not like certain foods due to taste and textures. She prefers rice, meat and fast food. She does not like any vegetables and only likes sterling and smoothies with frozen berries.  Father and her report decreased intake smaller meals and often only 2 meals, pt reports she feels full and often gets abdominal discomfort when eating larger meal.  She eats breakfast of rice and meat and then afternoon meal usually fast food burger or chicken sandwich with fountain drink and then 4-5 days a week she will eat evening snack of crackers or rice cakes or pb with toast.  She does report hard stools and often doesn't feel like she completely eliminates after BM. She also has concerns with elevated cholesterol.   She recently added protein shake daily. We discussed her diet is lacking in fiber, healthy fats and micronutrients from whole foods such as fruits, vegetables, nuts , seeds, and whole  grain.  We discussed slowly working to include some in daily diet .  Jaclyn is overwhelmed with these foods so we discussed working daily on introducing one new food at a time. We discussed how adding these foods will help with BM and ultimately being able to eat more, help with cholesterol and overall health.       Nutrition Diagnosis: Inadequate oral intake related oral aversion as evident by limited diet and low BMI    Intervention     Nutrition Education: Recommended Modification  Nutrition/Diet Handouts Given: Other Handouts Provided: high fiber food list       NUTRITION PRESCRIPTION:      Needs:  1600 Calorie     6ograms  Protein      Oral Diet Prescription: General (ANABEL)        Goals     Nutrition Goals:   Aim for 5 fruits and vegetables per day ultimately  Aim for nuts seeds healthy fats and whole grains   Start with week continue with current eating and add 1 1/2 cup serving of [place] daily, 1 frozen berry smoothie per day and continue with protein shake  Then start working on adding in a new fruit, add one vegetable and 1 nut or seed  Start with going to grocery store and picking out new food to try, then touching it, cutting it up or mashing it so you can get used to it visual and with your hands and then next step to smell it and then taste it.  You must taste new food at least 10 times before you can decide if you like or dont like  Follow up in one month on June 12 at 2pm please bring any new food you want to work on    Recommendation to MD: none     Assessment of Ability/Barriers     Patient and/or Family Will: Verbalize Understanding    Patient and/or Family Ability to Learn: Retain Information    Readiness to Learn: Motivated    Barriers to Learning: None        5/15/2025  Sarah Sherwood RD         [1]   Current Outpatient Medications:     Albuterol Sulfate  (90 Base) MCG/ACT Inhalation Aero Soln, Inhale 2 puffs via spacer every 4-6 hours for excessive cough, wheeze, or 20 mins before vigorous  exercise if needed. (Patient not taking: Reported on 5/4/2025), Disp: 18 g, Rfl: 0

## 2025-06-06 DIAGNOSIS — Z87.09 HISTORY OF ASTHMA: ICD-10-CM

## 2025-06-06 RX ORDER — ALBUTEROL SULFATE 90 UG/1
INHALANT RESPIRATORY (INHALATION)
Qty: 18 G | Refills: 0 | OUTPATIENT
Start: 2025-06-06

## (undated) NOTE — LETTER
8/30/2018              Jaclyn So        [address]        [place] [address]         To Whom It May Concern,    Please allow Jaclyn to use an albuterol MDI 2 puffs with a spacer q4 hours as needed while at school for the 201

## (undated) NOTE — LETTER
VACCINE ADMINISTRATION RECORD  PARENT / GUARDIAN APPROVAL  Date: 2018  Vaccine administered to: Babita Barone     : 2005    MRN: [id number]    A copy of the appropriate Centers for Disease Control and Prevention Vaccine Information statem

## (undated) NOTE — LETTER
Date & Time: 10/20/2024, 10:16 AM  Patient: Jaclyn So  Encounter Provider(s):    Suyapa Landis APRN       To Whom It May Concern:    Jaclyn So was seen and treated in our department on 10/19/2024. She should not return to work until 10/22/2024 .    If you have any questions or concerns, please do not hesitate to call.        _____________________________  Physician/APC Signature

## (undated) NOTE — LETTER
12/1/2018              Jaclyn So        [address]        [place] [address]         To Whom It May Concern. Anum Castaneda was seen in my office today. Please excuse her from school on 11/30.  If you have any questions you may co

## (undated) NOTE — LETTER
10/22/2018              Jaclyn So        [address]        [facility] [place] [address]         To Whom it may concern: This is to certify that Scotty Vazquez had an appointment on 10/22/2018 at 11:36 AM with Cyrus Salas.  DO Vanessa.

## (undated) NOTE — LETTER
VACCINE ADMINISTRATION RECORD  PARENT / GUARDIAN APPROVAL  Date: 2017  Vaccine administered to: Colten Velez     : 2005    MRN: [id number]    A copy of the appropriate Centers for Disease Control and Prevention Vaccine Information statem

## (undated) NOTE — LETTER
2/19/2020              Jaclyn Judah        [address]        [facility] [address]       Please excuse Jaclyn's absence on 2/18/20. She was seen for an appointment and may return to school tomorrow. Thank you.      Sincerely,

## (undated) NOTE — MR AVS SNAPSHOT
[address], [address] [address] [address] [facility]  [phone]               Thank you for choosing us for your health care visit with Lola Sutton MD.  We are glad to serve you and happy to provide you w

## (undated) NOTE — ED AVS SNAPSHOT
Margaret Ascencio   MRN: [id number]    Department:  [facility] Emergency Department   Date of Visit:  7/24/2019           Disclosure     Insurance plans vary and the physician(s) referred by the ER may not be covered by your plan.  Please contact CARE PHYSICIAN AT ONCE OR RETURN IMMEDIATELY TO THE EMERGENCY DEPARTMENT. If you have been prescribed any medication(s), please fill your prescription right away and begin taking the medication(s) as directed.   If you believe that any of the medications

## (undated) NOTE — MR AVS SNAPSHOT
[place] [address], [address] [address] [phone]               Thank you for choosing us for your health care visit with DOUG Bose.   We are glad to serve you and happy to provide you w BP Pulse Temp Weight          116/82 mmHg 99 99.8 °F (37.7 °C) 33. 566 kg (74 lb) (3 %*, Z = -1.84)      *Growth percentiles are based on CDC 2-20 Years data         Current Medications          This list is accurate as of: 1/9/17  2:01 PM.  Always use you baby begins eating solid foods, introduce nutritious foods early on and often. Sometimes toddlers need to try a food 10 times before they actually accept and enjoy it. It is also important to encourage play time as soon as they start crawling and walking.

## (undated) NOTE — LETTER
[facility] [employer] of Child Health Examination       Student's Name  Alis Eason Call Birth verifying above immunization history must sign below.   Signature                                                                                                                                     Title                           Date     Signature Student's Name  Jennifer Hunter Birth Date  12/27/2005  Sex  Female School   Grade Level/ID#  10th Grade   HEALTH HISTORY          TO BE COMPLETED AND SIGNED BY PARENT/GUARDIAN AND VERIFIED BY HEALTH CARE PROVIDER    ALLERGIES  (Food, drug, insect, other by MD/DO/APN/PA       PHYSICAL EXAMINATION REQUIREMENTS (head circumference if <33 years old):   /70   Pulse 73   Temp 98 °F (36.7 °C) (Tympanic)   Ht 4' 7\" (1.397 m)   Wt 32.7 kg (72 lb)   BMI 16.73 kg/m²     DIABETES SCREENING  BMI>85% age/sex  N Cardiovascular/HTN Yes  Nutritional status Yes    Respiratory Yes                   Diagnosis of Asthma: No Mental Health Yes        Currently Prescribed Asthma Medication:            Quick-relief  medication (e.g. Short Acting Beta Antagonist):  No

## (undated) NOTE — LETTER
[facility] [employer] Child Health Examination       Student's Name  Arielle Eason Signature                                                                                                                                   Title         MD                  Date   11/17/2018   Signature 12/27/2005  Sex  Female School   Grade Level/ID#  9th Grade   HEALTH HISTORY          TO BE COMPLETED AND SIGNED BY PARENT/GUARDIAN AND VERIFIED BY HEALTH CARE PROVIDER    ALLERGIES  (Food, drug, insect, other)  Patient has no known allergies.  MEDICATION PHYSICAL EXAMINATION REQUIREMENTS    Entire section below to be completed by MD/DO/APN/PA       PHYSICAL EXAMINATION REQUIREMENTS (head circumference if <33 years old):   /74   Pulse 88   Ht 4' 7.5\" (1.41 m)   Wt 33.6 kg (74 lb)   BMI 16.89 kg/m² Mouth/Dental Yes  Spinal examination Yes    Cardiovascular/HTN Yes  Nutritional status Yes    Respiratory Yes                   Diagnosis of Asthma: No Mental Health Yes        Currently Prescribed Asthma Medication:            Quick-relief  medication (e.

## (undated) NOTE — MR AVS SNAPSHOT
[facility], [address] [address] [address] [facility]  [phone]               Thank you for choosing us for your health care visit with Humberto Peres MD.  We are glad to serve you and happy to provide you w Follow-up Instructions     Return if symptoms worsen or fail to improve. [employer]     Sign up for [employer] access for your child.   [employer] access allows you to view health information for your child from their recent   visit, view other health

## (undated) NOTE — LETTER
5/2/2019              Jaclyn So        [address]        [facility] [address]         To Whom it may concern:     This is to certify that Aaliyah Marcus had an appointment on 5/2/2019 at 2:08 PM with Ludy Arango MD.  She

## (undated) NOTE — LETTER
3/4/2020              Jaclyn So        [address]        [place] [address]         Sarthak Arana was seen in the office today and will return to school tomorrow. Thank you.      Sincerely,    Cora Vega MD  [facility]

## (undated) NOTE — LETTER
VACCINE ADMINISTRATION RECORD  PARENT / GUARDIAN APPROVAL  Date: 2019  Vaccine administered to: Cortney Rosales     : 2005    MRN: [id number]    A copy of the appropriate Centers for Disease Control and Prevention Vaccine Information statem

## (undated) NOTE — LETTER
Date & Time: 10/20/2024, 10:34 AM  Patient: Jaclyn So  Encounter Provider(s):    Suyapa Landis APRN       To Whom It May Concern:    Jaclyn So was seen and treated in our department on 10/19/2024. Please excuse Zenaida bland from work tomorrow 10/21/2024    If you have any questions or concerns, please do not hesitate to call.      URSULA Monson  _____________________________  Physician/APC Signature

## (undated) NOTE — LETTER
3/5/2020          To Whom It May Concern:    Scotty Vazquez is currently under my medical care and may return to school at this time. Please excuse Ciera Martinez for 1 days. She may return to school on 3/6/2020. Activity is restricted as follows: none.

## (undated) NOTE — Clinical Note
1/23/2017              Jaclyn So        [address]         [facility] [address]         To whom it may concern,           Maria Teresa Saucedo was seen in my office on 1/20/17 due to illness and may return to school when feeling better.   If yo

## (undated) NOTE — LETTER
12/1/2018              Jaclyn So        [address]        [place] [address]         To Whom It May Concern,    Huan Aguilera was seen in my office today.  When she is running, please encourage her to build her endurance and allow h

## (undated) NOTE — LETTER
1/15/2020              Jaclyn So        [address]        [facility] [facility] [address]         To Whom It May Concern,    Nadia Renteria was seen in office today for a visit. She may return to school at this time.  Please excuse

## (undated) NOTE — Clinical Note
ASTHMA ACTION PLAN for [facility] Ana     : 2005     Date: 01/10/2017  Doctor:  Eliot Ochoa MD  Phone for doctor or clinic: Maribel Kingsley , [address] [facility], [address] [place] [phone] If your symptoms do not improve in ONE hour -  go to the emergency room or call 911 immediately! If symptoms improve, call office for appointment immediately.     Albuterol inhaler 2 puffs every 20 minutes for three treatments       Don't forget:  · Rinse

## (undated) NOTE — LETTER
3/8/2019              Jaclyn Judah        [address]        [place] [address]         Please excuse Jaclyn's mothers recent absence. She needed to be home with her daughter during illness and may return to work 3/9/19.  Thank y

## (undated) NOTE — LETTER
2/18/2020              Jaclyn So        [address]        [facility] [address]         Please excuse the absence on Stephanie Garcia from work today as he needed to be home to take his daughter to an appointment.  Thank you

## (undated) NOTE — LETTER
3/6/2019              Jaclyn So        [address]        [facility] [facility] [address]         To whom it may concern,          Denisa Leger may return to school when she is feeling better, she was seen today in the office for a sore throat.

## (undated) NOTE — LETTER
5/17/2021              Jaclyn So        [address]        [place] [facility] [address]         Dear Riley Swan,    Below are some other options for Therapists that you can contact for counseling/therapy. Julianne Babinski, Therapist, [facility]   Daniel Galindo, Therapist, [facility]   [place], Therapist, Clementine SCHWARTZ/ Zita Sanches, Therapist, [address] [place] [address], Therapist, Interventionist, Kadie Zapien     I have provided my contact information if additional resources are needed. I am closing the order at this time. Please feel free to re-refer the patient for navigation as needed.      Thank you,     Huey Apley, [address]   [address]   [phone]             Document electronically generated by:  Terrell Still

## (undated) NOTE — LETTER
9/1/2017              Jaclyn So        [address] [address]         To Whom It May Concern,    Please excuse Adal Allison from school today as she had a doctor's appointment for illness.       Sincerely,    Javier Love.

## (undated) NOTE — LETTER
10/22/2024          To Whom It May Concern:    Jaclyn So is currently under my medical care and may not return to work at this time due to fever.    Please excuse Jaclyn for 1 week.  She may return to work on 10/28/2024 so as long as she is fever free.  Activity is restricted as follows: none.    If you require additional information please contact our office.        Sincerely,    WAN Roman, FNP-BC              Document generated by:  WAN Roman

## (undated) NOTE — LETTER
[facility] [employer] of Child Health Examination       Student's Name  Esvin Eason sign below.   Signature                                                                                                                                     Title                           Date  4/19/2021   Signature 12/27/2005  Sex  Female School   Grade Level/ID#  12th Grade   HEALTH HISTORY          TO BE COMPLETED AND SIGNED BY PARENT/GUARDIAN AND VERIFIED BY HEALTH CARE PROVIDER    ALLERGIES  (Food, drug, insect, other)  Patient has no known allergies.  MEDICATION ____Plate    ____Other  Other concerns? Ear/Hearing problems? Yes   No  Information may be shared with appropriate personnel for health /educational purposes. Bone/Joint problem/injury/scoliosis?    Yes   No  Parent/Guardian Signature Normal Comments/Follow-up/Needs  Normal Comments/Follow-up/Needs   Skin Yes  Endocrine Yes    Ears Yes                      Screen result: Gastrointestinal Yes    Eyes Yes     Screen result:   Genito-Urinary Yes  LMP   Nose Yes  Neurological Yes    Throat [address] [place] [id number]  7 [facility] 11/15                                                                    Printed by the [employer] [facility]

## (undated) NOTE — LETTER
Mirela Sanchez, [address] [facility], [address] [place] [address] A       07/18/17        Patient: Roby Perez   YOB: 2005   Date of Visit: 7/18/2017       Dear  Dr. Yuridia Glover MD,      Thank you for referring Chirag Guerrero

## (undated) NOTE — LETTER
3/8/2019              Jaclyn Judah        [address]        [place] [address]         Please excuse Jaclyn's recent absence. She was out due to illness and will return to school on Monday 3/11/2019. Thank you.      Sincerely,

## (undated) NOTE — Clinical Note
1/9/2017              Jaclyn Judah        [address] [facility] [address] [facility] [address]         To Whom it may concern: This is to certify that Colten Velez had an appointment on 1/9/2017 at 2:02 PM with DOUG Childers.   Please ex

## (undated) NOTE — Clinical Note
1/19/2017              Jaclyn So        [address] [address]         To Whom It May Concern,    Ernesto Guevara is under my care and can walk in gym, no other activities until seen by ENT doctor on 1/23/17.

## (undated) NOTE — LETTER
5/17/2021              Jaclyn Judah        [address]        [place] [facility] [place] [facility] [place] [address]         To Whom It May Concern,    Abel Sneed is under my care and is currently experiencing anxiety that requires treatment. Due to this condition, please allow Jaclyn to complete the remainder of the school year remotely. If you have any questions, please call my office.       Sincerely,        Joe Blount, [address]  [address] [place] [address] [address]  [phone]        Document electronically generated by:  Ellen Salcedo

## (undated) NOTE — LETTER
10/22/2024          To Whom It May Concern:    Jaclyn So is currently under my medical care and may not return to work at this time since she is currently febrile.    Please excuse Jaclyn for 2 days.  She may return to work on 10/24/2024 so as long as she is fever free.  Activity is restricted as follows: none.    If you require additional information please contact our office.        Sincerely,    WAN Roman, FNP-BC              Document generated by:  WAN Roman

## (undated) NOTE — LETTER
Date & Time: 7/24/2019, 9:41 PM  Patient: Lynette Painter  Encounter Provider(s):    Charlie Ramos MD       To Whom It May Concern:    Lynette Painter was seen and treated in our department on 7/24/201 with her father.  She should not return to work until

## (undated) NOTE — LETTER
Date & Time: 7/24/2019, 9:40 PM  Patient: Za Posadas  Encounter Provider(s):    Jair Krueger MD        To Whom It May Concern:    Za Posadas was seen and treated in our department on 7/24/2019 with her mother.  She should not return to work [place]

## (undated) NOTE — LETTER
3/6/2019              Jaclyn So        [address]        [place] [address]         To Whom it may concern: This is to certify that Delgado Stokes had an appointment on 3/6/2019 at 2:01 PM with Lali Lott. DO. Alexandre Mendez

## (undated) NOTE — LETTER
9/21/2021              Jaclyn So        [address]        [facility] [address]         To Whom It May Concern,  Nely Santos was seen at my office today .  Please excuse father, Nicola Blankenship, and allow him to take c

## (undated) NOTE — LETTER
[address] [employer] of Child Health Examination       [facility]'s Name  [facility] Signature                                                                                                                                   Title                           Date     Signature Female School   Grade Level/ID#  8th Grade   HEALTH HISTORY          TO BE COMPLETED AND SIGNED BY PARENT/GUARDIAN AND VERIFIED BY HEALTH CARE PROVIDER    ALLERGIES  (Food, drug, insect, other)  Patient has no known allergies.  MEDICATION  (List all prescri PHYSICAL EXAMINATION REQUIREMENTS (head circumference if <33 years old):   /68   Pulse 80   Ht 4' 7\" (1.397 m)   Wt 34 kg (75 lb)   BMI 17.43 kg/m²     DIABETES SCREENING  BMI>85% age/sex  No And any two of the following:  Family History Yes     Et Respiratory Yes                   Diagnosis of Asthma: No Mental Health Yes        Currently Prescribed Asthma Medication:            Quick-relief  medication (e.g. Short Acting Beta Antagonist): No          Controller medication (e.g. inhaled corticostero

## (undated) NOTE — LETTER
ASTHMA ACTION PLAN for Gwen Hitchcock     : 2005     Date: 18  Doctor:  Lori Aguayo.  [facility],   Phone for doctor or clinic: [address] [facility], [address] [phone] If your symptoms do not improve in ONE hour -  go to the emergency room or call 911 immediately! If symptoms improve, call office for appointment immediately.     Albuterol inhaler 2 puffs every 20 minutes for three treatments       Don't forget:  · Rinse